# Patient Record
Sex: MALE | Race: WHITE | NOT HISPANIC OR LATINO | Employment: FULL TIME | URBAN - METROPOLITAN AREA
[De-identification: names, ages, dates, MRNs, and addresses within clinical notes are randomized per-mention and may not be internally consistent; named-entity substitution may affect disease eponyms.]

---

## 2023-10-02 ENCOUNTER — OFFICE VISIT (OUTPATIENT)
Dept: URGENT CARE | Facility: URGENT CARE | Age: 23
End: 2023-10-02
Payer: COMMERCIAL

## 2023-10-02 VITALS
SYSTOLIC BLOOD PRESSURE: 122 MMHG | TEMPERATURE: 98.8 F | HEART RATE: 93 BPM | WEIGHT: 175 LBS | DIASTOLIC BLOOD PRESSURE: 74 MMHG | OXYGEN SATURATION: 95 %

## 2023-10-02 DIAGNOSIS — S61.412A LACERATION OF LEFT HAND WITHOUT FOREIGN BODY, INITIAL ENCOUNTER: Primary | ICD-10-CM

## 2023-10-02 PROCEDURE — 12001 RPR S/N/AX/GEN/TRNK 2.5CM/<: CPT | Mod: LT | Performed by: NURSE PRACTITIONER

## 2023-10-02 NOTE — PROGRESS NOTES
SUBJECTIVE:     Chief Complaint   Patient presents with    Urgent Care     Today while working on equipment got left hand was jammed into equipment, puncture wound, bleeding,         Thierno Hall is a 23 year old male who presents to the clinic with a laceration on the left hand sustained 1 hour(s) ago.  This is a non-work related injury.    Mechanism of injury: Cut on new bobcat medical equipment [place]    Associated symptoms: Denies numbness, weakness, or loss of function  Last tetanus booster within 10 years: yes 6/18/2014    EXAM:   The patient appears today in alert,no apparent distress  VITALS: /74   Pulse 93   Temp 98.8  F (37.1  C) (Tympanic)   Wt 79.4 kg (175 lb)   SpO2 95%     Size of laceration: 2 centimeters, tendon visualized, but intact and not severed, perfect ROM, good sensation  Characteristics of the laceration: bleeding- mild and jagged  Tendon function intact: yes  Sensation to light touch intact: yes  Pulses intact: yes    Assessment:  Laceration of left hand without foreign body, initial encounter    PLAN:  PROCEDURE NOTE:  Wound was locally injected with 2 cc's of Lidocaine 2% plain  Wound cleaned with HIBICLENS  Wound cleaned with saline  Wound soaked  Wound irrigated  Laceration was closed using 3 4-0 interrupted sutures    After care instructions:  Keep wound clean and dry for the next 24-48 hours  Sutures out in 10-14 days  Wash daily with lukewarm soapy water and new bandage thereafter  Signs of infection discussed today  Apply anti-bacterial ointment  May return to work as long as wound is kept clean and dry  Discussed the probability of scarring

## 2023-10-02 NOTE — PATIENT INSTRUCTIONS
Keep wound clean and dry for the next 24-48 hours  Sutures out in 10-14 days  Wash daily with lukewarm soapy water and new bandage thereafter  Signs of infection discussed today  Apply anti-bacterial ointment  May return to work as long as wound is kept clean and dry  Discussed the probability of scarring

## 2023-10-03 ENCOUNTER — TELEPHONE (OUTPATIENT)
Dept: FAMILY MEDICINE | Facility: CLINIC | Age: 23
End: 2023-10-03

## 2023-10-03 NOTE — TELEPHONE ENCOUNTER
"Pt calling stating he was seen on 10/2/2023 for a hand laceration - he is looking for a work note for work the next two days due to the location of the injury and he works at [employer] and uses his hands as he works in the \"yard\" of the store    Please advise     Thank you     Best # [phone] ok LM     Kassie Garcia RN, BSN  [facility]    "